# Patient Record
Sex: FEMALE | Race: WHITE | NOT HISPANIC OR LATINO | Employment: FULL TIME | URBAN - METROPOLITAN AREA
[De-identification: names, ages, dates, MRNs, and addresses within clinical notes are randomized per-mention and may not be internally consistent; named-entity substitution may affect disease eponyms.]

---

## 2024-08-25 ENCOUNTER — APPOINTMENT (OUTPATIENT)
Dept: PRIMARY CARE | Facility: CLINIC | Age: 49
End: 2024-08-25
Payer: COMMERCIAL

## 2024-08-26 ASSESSMENT — DERMATOLOGY QUALITY OF LIFE (QOL) ASSESSMENT
RATE HOW EMOTIONALLY BOTHERED YOU ARE BY YOUR SKIN PROBLEM (FOR EXAMPLE, WORRY, EMBARRASSMENT, FRUSTRATION): 6 - ALWAYS BOTHERED
WHAT SINGLE SKIN CONDITION LISTED BELOW IS THE PATIENT ANSWERING THE QUALITY-OF-LIFE ASSESSMENT QUESTIONS ABOUT: PSORIASIS
RATE HOW BOTHERED YOU ARE BY SYMPTOMS OF YOUR SKIN PROBLEM (EG, ITCHING, STINGING BURNING, HURTING OR SKIN IRRITATION): 6 - ALWAYS BOTHERED
RATE HOW BOTHERED YOU ARE BY EFFECTS OF YOUR SKIN PROBLEMS ON YOUR ACTIVITIES (EG, GOING OUT, ACCOMPLISHING WHAT YOU WANT, WORK ACTIVITIES OR YOUR RELATIONSHIPS WITH OTHERS): 6 - ALWAYS BOTHERED
WHAT SINGLE SKIN CONDITION LISTED BELOW IS THE PATIENT ANSWERING THE QUALITY-OF-LIFE ASSESSMENT QUESTIONS ABOUT: PSORIASIS
RATE HOW BOTHERED YOU ARE BY SYMPTOMS OF YOUR SKIN PROBLEM (EG, ITCHING, STINGING BURNING, HURTING OR SKIN IRRITATION): 6 - ALWAYS BOTHERED
RATE HOW BOTHERED YOU ARE BY EFFECTS OF YOUR SKIN PROBLEMS ON YOUR ACTIVITIES (EG, GOING OUT, ACCOMPLISHING WHAT YOU WANT, WORK ACTIVITIES OR YOUR RELATIONSHIPS WITH OTHERS): 6 - ALWAYS BOTHERED
DATE THE QUALITY-OF-LIFE ASSESSMENT WAS COMPLETED: 67078
RATE HOW EMOTIONALLY BOTHERED YOU ARE BY YOUR SKIN PROBLEM (FOR EXAMPLE, WORRY, EMBARRASSMENT, FRUSTRATION): 6 - ALWAYS BOTHERED

## 2024-08-26 ASSESSMENT — PATIENT GLOBAL ASSESSMENT (PGA): WHAT IS THE PGA: PATIENT GLOBAL ASSESSMENT:  4 - SEVERE

## 2024-08-27 ENCOUNTER — LAB (OUTPATIENT)
Dept: LAB | Facility: LAB | Age: 49
End: 2024-08-27
Payer: COMMERCIAL

## 2024-08-27 ENCOUNTER — OFFICE VISIT (OUTPATIENT)
Dept: DERMATOLOGY | Facility: CLINIC | Age: 49
End: 2024-08-27
Payer: COMMERCIAL

## 2024-08-27 DIAGNOSIS — L40.9 PSORIASIS: Primary | ICD-10-CM

## 2024-08-27 DIAGNOSIS — L40.9 PSORIASIS: ICD-10-CM

## 2024-08-27 PROCEDURE — 86481 TB AG RESPONSE T-CELL SUSP: CPT

## 2024-08-27 PROCEDURE — 86803 HEPATITIS C AB TEST: CPT

## 2024-08-27 PROCEDURE — 87389 HIV-1 AG W/HIV-1&-2 AB AG IA: CPT

## 2024-08-27 PROCEDURE — 80053 COMPREHEN METABOLIC PANEL: CPT

## 2024-08-27 PROCEDURE — 85025 COMPLETE CBC W/AUTO DIFF WBC: CPT

## 2024-08-27 PROCEDURE — 99204 OFFICE O/P NEW MOD 45 MIN: CPT | Performed by: STUDENT IN AN ORGANIZED HEALTH CARE EDUCATION/TRAINING PROGRAM

## 2024-08-27 PROCEDURE — 86704 HEP B CORE ANTIBODY TOTAL: CPT

## 2024-08-27 PROCEDURE — 87340 HEPATITIS B SURFACE AG IA: CPT

## 2024-08-27 PROCEDURE — 36415 COLL VENOUS BLD VENIPUNCTURE: CPT

## 2024-08-27 RX ORDER — CLOBETASOL PROPIONATE 0.5 MG/G
1 OINTMENT TOPICAL 2 TIMES DAILY
COMMUNITY
Start: 2022-08-08

## 2024-08-27 RX ORDER — ROSUVASTATIN CALCIUM 20 MG/1
1 TABLET, COATED ORAL NIGHTLY
COMMUNITY
Start: 2022-08-09 | End: 2024-08-29 | Stop reason: SDUPTHER

## 2024-08-27 RX ORDER — BETAMETHASONE DIPROPIONATE 0.5 MG/G
OINTMENT TOPICAL
Qty: 45 G | Refills: 3 | Status: SHIPPED | OUTPATIENT
Start: 2024-08-27

## 2024-08-27 RX ORDER — VARENICLINE TARTRATE 0.5 (11)-1
KIT ORAL
COMMUNITY
Start: 2022-08-08 | End: 2024-08-29 | Stop reason: SDUPTHER

## 2024-08-27 NOTE — PROGRESS NOTES
"Subjective   Ju Schwarz is a 49 y.o. female who presents for the following: Rash (Present on right foot for 2 years and is now on left foot and both hands. Notes joint pain throughout the day (has an appt in rheumatology Oct. 1). Currently using Clobetasol ointment (\"not frequently, feels this doesn't help), Gold Bond for psoriasis moisturizer, OTC hydrocortisone.)    Objective   Well appearing patient in no apparent distress; mood and affect are within normal limits.    A focused examination was performed including extremities, including the arms, hands, fingers, and fingernails and the legs, feet, toes, and toenails, head, including the scalp, face, neck, nose, ears, eyelids, and lips, and chest, axillae, abdomen, back, and buttocks. All findings within normal limits unless otherwise noted below.    Left Foot - Posterior, Left Hand - Anterior, Right Foot - Posterior, Right Hand - Anterior, Right Lower Leg - Anterior  Well-demarcated erythematous papules and plaques with overlying silvery scale. 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 Joint pain worsens after long periods of immobility - could be consistent.   -discussed nature of condition and recommendation for treatment with biologic medication. Failed topical therapy (clobetasol).   -Risks and benefits of Skyrizi include increased risk of infections, hypersensitivity reactions, injection site reactions, and need to avoid live vaccines. Patient verbalized understanding and desire to proceed with starting medication.   -In meantime, she can try switching topical to betamethasone diproprionate 0.05% oint BID to the affected areas under occlusion.  -baseline labs ordered  -no hx cancer, IBD, heart " failure. She reports questionable hx MS. Would avoid TNF inhibitors.   -rtc 2 months      Related Procedures  Follow Up In Dermatology - Established Patient  CBC and Auto Differential  Comprehensive metabolic panel  T-SPOT (Tb)  HIV 1/2 Antigen/Antibody Screen with Reflex to Confirmation  Hepatitis B Core Antibody, Total  Hepatitis B Surface Antigen  Hepatitis C Antibody    Related Medications  risankizumab-rzaa (Skyrizi) 150 mg/mL pen injector pen  Inject 1 mL (150 mg) under the skin 1 time for 1 dose. 4 weeks later, Inject 1 mL (150 mg) under the skin 1 time for 1 dose.    risankizumab-rzaa (Skyrizi) 150 mg/mL pen injector pen  Inject 1 mL (150 mg) under the skin every 12 weeks.    betamethasone dipropionate (Diprosone) 0.05 % ointment  Apply to the affected area twice daily.    RTC 3 months    Scribe Attestation  By signing my name below, ITracy LPN , Scribe   attest that this documentation has been prepared under the direction and in the presence of Dewayne Salgado MD.

## 2024-08-28 ENCOUNTER — SPECIALTY PHARMACY (OUTPATIENT)
Dept: PHARMACY | Facility: CLINIC | Age: 49
End: 2024-08-28

## 2024-08-28 LAB
ALBUMIN SERPL BCP-MCNC: 4.1 G/DL (ref 3.4–5)
ALP SERPL-CCNC: 108 U/L (ref 33–110)
ALT SERPL W P-5'-P-CCNC: 14 U/L (ref 7–45)
ANION GAP SERPL CALC-SCNC: 15 MMOL/L (ref 10–20)
AST SERPL W P-5'-P-CCNC: 12 U/L (ref 9–39)
BASOPHILS # BLD AUTO: 0.04 X10*3/UL (ref 0–0.1)
BASOPHILS NFR BLD AUTO: 0.6 %
BILIRUB SERPL-MCNC: 0.5 MG/DL (ref 0–1.2)
BUN SERPL-MCNC: 9 MG/DL (ref 6–23)
CALCIUM SERPL-MCNC: 9.6 MG/DL (ref 8.6–10.6)
CHLORIDE SERPL-SCNC: 104 MMOL/L (ref 98–107)
CO2 SERPL-SCNC: 26 MMOL/L (ref 21–32)
CREAT SERPL-MCNC: 0.81 MG/DL (ref 0.5–1.05)
EGFRCR SERPLBLD CKD-EPI 2021: 88 ML/MIN/1.73M*2
EOSINOPHIL # BLD AUTO: 0.08 X10*3/UL (ref 0–0.7)
EOSINOPHIL NFR BLD AUTO: 1.2 %
ERYTHROCYTE [DISTWIDTH] IN BLOOD BY AUTOMATED COUNT: 14.3 % (ref 11.5–14.5)
GLUCOSE SERPL-MCNC: 103 MG/DL (ref 74–99)
HBV CORE AB SER QL: NONREACTIVE
HBV SURFACE AG SERPL QL IA: NONREACTIVE
HCT VFR BLD AUTO: 43.4 % (ref 36–46)
HCV AB SER QL: NONREACTIVE
HGB BLD-MCNC: 13.4 G/DL (ref 12–16)
HIV 1+2 AB+HIV1 P24 AG SERPL QL IA: NONREACTIVE
IMM GRANULOCYTES # BLD AUTO: 0.01 X10*3/UL (ref 0–0.7)
IMM GRANULOCYTES NFR BLD AUTO: 0.2 % (ref 0–0.9)
LYMPHOCYTES # BLD AUTO: 2.24 X10*3/UL (ref 1.2–4.8)
LYMPHOCYTES NFR BLD AUTO: 33.7 %
MCH RBC QN AUTO: 28.8 PG (ref 26–34)
MCHC RBC AUTO-ENTMCNC: 30.9 G/DL (ref 32–36)
MCV RBC AUTO: 93 FL (ref 80–100)
MONOCYTES # BLD AUTO: 0.53 X10*3/UL (ref 0.1–1)
MONOCYTES NFR BLD AUTO: 8 %
NEUTROPHILS # BLD AUTO: 3.75 X10*3/UL (ref 1.2–7.7)
NEUTROPHILS NFR BLD AUTO: 56.3 %
NRBC BLD-RTO: 0 /100 WBCS (ref 0–0)
PLATELET # BLD AUTO: 251 X10*3/UL (ref 150–450)
POTASSIUM SERPL-SCNC: 4.6 MMOL/L (ref 3.5–5.3)
PROT SERPL-MCNC: 6.8 G/DL (ref 6.4–8.2)
RBC # BLD AUTO: 4.66 X10*6/UL (ref 4–5.2)
SODIUM SERPL-SCNC: 140 MMOL/L (ref 136–145)
WBC # BLD AUTO: 6.7 X10*3/UL (ref 4.4–11.3)

## 2024-08-29 ENCOUNTER — OFFICE VISIT (OUTPATIENT)
Dept: PRIMARY CARE | Facility: CLINIC | Age: 49
End: 2024-08-29
Payer: COMMERCIAL

## 2024-08-29 ENCOUNTER — TELEPHONE (OUTPATIENT)
Dept: PRIMARY CARE | Facility: CLINIC | Age: 49
End: 2024-08-29

## 2024-08-29 VITALS
HEART RATE: 65 BPM | OXYGEN SATURATION: 97 % | DIASTOLIC BLOOD PRESSURE: 78 MMHG | HEIGHT: 69 IN | WEIGHT: 271 LBS | BODY MASS INDEX: 40.14 KG/M2 | SYSTOLIC BLOOD PRESSURE: 122 MMHG

## 2024-08-29 DIAGNOSIS — R73.9 HYPERGLYCEMIA: ICD-10-CM

## 2024-08-29 DIAGNOSIS — F17.200 CURRENTLY SMOKES TOBACCO: Chronic | ICD-10-CM

## 2024-08-29 DIAGNOSIS — E66.01 OBESITY, MORBID, BMI 40.0-49.9 (MULTI): Chronic | ICD-10-CM

## 2024-08-29 DIAGNOSIS — Z90.710 S/P HYSTERECTOMY: ICD-10-CM

## 2024-08-29 DIAGNOSIS — Z13.29 THYROID DISORDER SCREENING: ICD-10-CM

## 2024-08-29 DIAGNOSIS — E78.2 MIXED HYPERLIPIDEMIA: Primary | ICD-10-CM

## 2024-08-29 DIAGNOSIS — F90.2 ADHD (ATTENTION DEFICIT HYPERACTIVITY DISORDER), COMBINED TYPE: Chronic | ICD-10-CM

## 2024-08-29 DIAGNOSIS — Z12.11 ENCOUNTER FOR SCREENING FOR MALIGNANT NEOPLASM OF COLON: ICD-10-CM

## 2024-08-29 LAB
ALBUMIN SERPL BCP-MCNC: 4 G/DL (ref 3.4–5)
ALP SERPL-CCNC: 123 U/L (ref 33–110)
ALT SERPL W P-5'-P-CCNC: 14 U/L (ref 7–45)
ANION GAP SERPL CALC-SCNC: 14 MMOL/L (ref 10–20)
AST SERPL W P-5'-P-CCNC: 11 U/L (ref 9–39)
BILIRUB SERPL-MCNC: 0.3 MG/DL (ref 0–1.2)
BUN SERPL-MCNC: 10 MG/DL (ref 6–23)
CALCIUM SERPL-MCNC: 9.5 MG/DL (ref 8.6–10.6)
CHLORIDE SERPL-SCNC: 103 MMOL/L (ref 98–107)
CO2 SERPL-SCNC: 25 MMOL/L (ref 21–32)
CREAT SERPL-MCNC: 0.73 MG/DL (ref 0.5–1.05)
EGFRCR SERPLBLD CKD-EPI 2021: >90 ML/MIN/1.73M*2
ERYTHROCYTE [DISTWIDTH] IN BLOOD BY AUTOMATED COUNT: 14 % (ref 11.5–14.5)
EST. AVERAGE GLUCOSE BLD GHB EST-MCNC: 126 MG/DL
GLUCOSE SERPL-MCNC: 89 MG/DL (ref 74–99)
HBA1C MFR BLD: 6 %
HCT VFR BLD AUTO: 43.4 % (ref 36–46)
HGB BLD-MCNC: 13.6 G/DL (ref 12–16)
MCH RBC QN AUTO: 28.9 PG (ref 26–34)
MCHC RBC AUTO-ENTMCNC: 31.3 G/DL (ref 32–36)
MCV RBC AUTO: 92 FL (ref 80–100)
NIL(NEG) CONTROL SPOT COUNT: NORMAL
NRBC BLD-RTO: 0 /100 WBCS (ref 0–0)
PANEL A SPOT COUNT: 0
PANEL B SPOT COUNT: 0
PLATELET # BLD AUTO: 247 X10*3/UL (ref 150–450)
POS CONTROL SPOT COUNT: NORMAL
POTASSIUM SERPL-SCNC: 4.7 MMOL/L (ref 3.5–5.3)
PROGEST SERPL-MCNC: 0.3 NG/ML
PROT SERPL-MCNC: 6.7 G/DL (ref 6.4–8.2)
RBC # BLD AUTO: 4.7 X10*6/UL (ref 4–5.2)
SODIUM SERPL-SCNC: 137 MMOL/L (ref 136–145)
T-SPOT. TB INTERPRETATION: NEGATIVE
TSH SERPL-ACNC: 0.67 MIU/L (ref 0.44–3.98)
WBC # BLD AUTO: 8.3 X10*3/UL (ref 4.4–11.3)

## 2024-08-29 PROCEDURE — 99214 OFFICE O/P EST MOD 30 MIN: CPT | Performed by: STUDENT IN AN ORGANIZED HEALTH CARE EDUCATION/TRAINING PROGRAM

## 2024-08-29 PROCEDURE — 84144 ASSAY OF PROGESTERONE: CPT

## 2024-08-29 PROCEDURE — 84443 ASSAY THYROID STIM HORMONE: CPT

## 2024-08-29 PROCEDURE — 85027 COMPLETE CBC AUTOMATED: CPT

## 2024-08-29 PROCEDURE — 83036 HEMOGLOBIN GLYCOSYLATED A1C: CPT

## 2024-08-29 PROCEDURE — 99406 BEHAV CHNG SMOKING 3-10 MIN: CPT | Performed by: STUDENT IN AN ORGANIZED HEALTH CARE EDUCATION/TRAINING PROGRAM

## 2024-08-29 PROCEDURE — 3008F BODY MASS INDEX DOCD: CPT | Performed by: STUDENT IN AN ORGANIZED HEALTH CARE EDUCATION/TRAINING PROGRAM

## 2024-08-29 PROCEDURE — 80053 COMPREHEN METABOLIC PANEL: CPT

## 2024-08-29 RX ORDER — LISDEXAMFETAMINE DIMESYLATE 40 MG/1
40 CAPSULE ORAL EVERY MORNING
Qty: 30 CAPSULE | Refills: 0 | Status: SHIPPED | OUTPATIENT
Start: 2024-09-28 | End: 2024-10-28

## 2024-08-29 RX ORDER — VARENICLINE TARTRATE 0.5 (11)-1
KIT ORAL
Qty: 53 EACH | Refills: 0 | Status: SHIPPED | OUTPATIENT
Start: 2024-08-29

## 2024-08-29 RX ORDER — ROSUVASTATIN CALCIUM 20 MG/1
20 TABLET, COATED ORAL NIGHTLY
Qty: 90 TABLET | Refills: 1 | Status: SHIPPED | OUTPATIENT
Start: 2024-08-29

## 2024-08-29 RX ORDER — LISDEXAMFETAMINE DIMESYLATE 40 MG/1
40 CAPSULE ORAL EVERY MORNING
Qty: 30 CAPSULE | Refills: 0 | Status: SHIPPED | OUTPATIENT
Start: 2024-10-28 | End: 2024-11-27

## 2024-08-29 RX ORDER — LISDEXAMFETAMINE DIMESYLATE 40 MG/1
40 CAPSULE ORAL EVERY MORNING
Qty: 30 CAPSULE | Refills: 0 | Status: SHIPPED | OUTPATIENT
Start: 2024-08-29 | End: 2024-09-28

## 2024-08-29 ASSESSMENT — ENCOUNTER SYMPTOMS
ARTHRALGIAS: 1
NEUROLOGICAL NEGATIVE: 1
UNEXPECTED WEIGHT CHANGE: 1
GASTROINTESTINAL NEGATIVE: 1
FATIGUE: 1
RESPIRATORY NEGATIVE: 1
SLEEP DISTURBANCE: 1
CARDIOVASCULAR NEGATIVE: 1

## 2024-08-29 ASSESSMENT — PATIENT HEALTH QUESTIONNAIRE - PHQ9
SUM OF ALL RESPONSES TO PHQ9 QUESTIONS 1 AND 2: 0
1. LITTLE INTEREST OR PLEASURE IN DOING THINGS: NOT AT ALL
2. FEELING DOWN, DEPRESSED OR HOPELESS: NOT AT ALL

## 2024-08-29 NOTE — PROGRESS NOTES
Subjective   Patient ID: Ju Schwarz is a 49 y.o. female who presents for Obesity (Would like to talk about weight loss options /Refill for chantix /).  Last seen 2 years ago.     Still has foot rash. Saw dermatology for psoriasis. Also having joint pains. Seeing rheumatologist in October.     Wants to lose weight.    Has coffee in the morning. Skips breakfast and lunch. Works nights. Fast food for dinner sometimes. Snacks on popsicles and ice cream.     Hard time sleeping during the day. Gets approx 4 hours sleep. Drinks a lot of coffee.     Hx of ADHD. Used to be on vyvanse. Did not have any side effects on this. Tolerated well and controlled her symptoms well.     Prior hysterectomy. Starting to have hot flashes.     Smoking 1 ppd. Wants refill of chantix to help her quit.     Does not think she has ever had a mammogram. Did not end up doing cologuard previously.     No other concerns today.         Review of Systems   Constitutional:  Positive for fatigue and unexpected weight change.   HENT: Negative.     Respiratory: Negative.     Cardiovascular: Negative.    Gastrointestinal: Negative.    Endocrine: Positive for heat intolerance.   Musculoskeletal:  Positive for arthralgias.   Skin:  Positive for rash.   Neurological: Negative.    Psychiatric/Behavioral:  Positive for sleep disturbance.    All other systems reviewed and are negative.      Objective   Physical Exam  Constitutional:       General: She is not in acute distress.  Pulmonary:      Effort: Pulmonary effort is normal. No respiratory distress.   Skin:     General: Skin is warm and dry.   Neurological:      Mental Status: She is alert. Mental status is at baseline.   Psychiatric:         Mood and Affect: Mood normal.         Behavior: Behavior normal.       Body mass index is 40.02 kg/m².      Current Outpatient Medications:     betamethasone dipropionate (Diprosone) 0.05 % ointment, Apply to the affected area twice daily., Disp: 45 g, Rfl: 3     clobetasol (Temovate) 0.05 % ointment, Apply 1 Application topically twice a day., Disp: , Rfl:     lisdexamfetamine (Vyvanse) 40 mg capsule, Take 1 capsule (40 mg) by mouth once daily in the morning., Disp: 30 capsule, Rfl: 0    [START ON 9/28/2024] lisdexamfetamine (Vyvanse) 40 mg capsule, Take 1 capsule (40 mg) by mouth once daily in the morning. Do not fill before September 28, 2024., Disp: 30 capsule, Rfl: 0    [START ON 10/28/2024] lisdexamfetamine (Vyvanse) 40 mg capsule, Take 1 capsule (40 mg) by mouth once daily in the morning. Do not fill before October 28, 2024., Disp: 30 capsule, Rfl: 0    risankizumab-rzaa (Skyrizi) 150 mg/mL pen injector pen, Inject 1 mL (150 mg) under the skin every 12 weeks., Disp: 1 mL, Rfl: 3    risankizumab-rzaa (Skyrizi) 150 mg/mL pen injector pen, Inject 1 mL (150 mg) under the skin 1 time for 1 dose. 4 weeks later, Inject 1 mL (150 mg) under the skin 1 time for 1 dose., Disp: 2 mL, Rfl: 0    rosuvastatin (Crestor) 20 mg tablet, Take 1 tablet (20 mg) by mouth once daily at bedtime., Disp: 90 tablet, Rfl: 1    varenicline (Chantix JACKIE) 0.5 mg (11)- 1 mg (42) tablet, Take as directed on package, Disp: 53 each, Rfl: 0        Assessment/Plan   Diagnoses and all orders for this visit:  Mixed hyperlipidemia  Comments:  resume statin   check lipid panel next visit  Orders:  -     rosuvastatin (Crestor) 20 mg tablet; Take 1 tablet (20 mg) by mouth once daily at bedtime.  -     CBC  Currently smokes tobacco  Comments:  chantix refilled  Orders:  -     varenicline (Chantix JACKIE) 0.5 mg (11)- 1 mg (42) tablet; Take as directed on package  Encounter for screening for malignant neoplasm of colon  Comments:  no prior colon cancer screening  Orders:  -     Cologuard® colon cancer screening; Future  Hyperglycemia  -     Comprehensive Metabolic Panel  -     Hemoglobin A1c  Thyroid disorder screening  -     TSH with reflex to Free T4 if abnormal  S/P hysterectomy  Comments:  check progesterone  level  Orders:  -     Progesterone  ADHD (attention deficit hyperactivity disorder), combined type  Comments:  CSA completed  start with 40mg vyvanse and reassess  Orders:  -     lisdexamfetamine (Vyvanse) 40 mg capsule; Take 1 capsule (40 mg) by mouth once daily in the morning.  -     lisdexamfetamine (Vyvanse) 40 mg capsule; Take 1 capsule (40 mg) by mouth once daily in the morning. Do not fill before September 28, 2024.  -     lisdexamfetamine (Vyvanse) 40 mg capsule; Take 1 capsule (40 mg) by mouth once daily in the morning. Do not fill before October 28, 2024.  Obesity, morbid, BMI 40.0-49.9 (Multi)  Comments:  encouraged adding in more protein and limiting processed foods    Greater than 3 minutes spent in tobacco cessation counseling including behavioral and pharmaceutical options       Follow up in 3 months       Isatu Fountain DO 08/29/24 12:47 PM

## 2024-08-29 NOTE — TELEPHONE ENCOUNTER
"Pt called stating her insurance wasn't covering vyvanse stating she was too \"old\"  for it. I put in a PA for it first to see if that helps. But if that doesn't work, is there an alternative you can offer?  "

## 2024-08-30 ENCOUNTER — PATIENT MESSAGE (OUTPATIENT)
Dept: PRIMARY CARE | Facility: CLINIC | Age: 49
End: 2024-08-30
Payer: COMMERCIAL

## 2024-08-30 DIAGNOSIS — L40.9 PSORIASIS: ICD-10-CM

## 2024-08-30 DIAGNOSIS — R73.9 HYPERGLYCEMIA: Primary | ICD-10-CM

## 2024-08-30 DIAGNOSIS — M19.90 ARTHRITIS: ICD-10-CM

## 2024-08-30 DIAGNOSIS — F90.2 ADHD (ATTENTION DEFICIT HYPERACTIVITY DISORDER), COMBINED TYPE: ICD-10-CM

## 2024-09-03 DIAGNOSIS — F90.2 ADHD (ATTENTION DEFICIT HYPERACTIVITY DISORDER), COMBINED TYPE: Chronic | ICD-10-CM

## 2024-09-04 ENCOUNTER — PATIENT MESSAGE (OUTPATIENT)
Dept: DERMATOLOGY | Facility: CLINIC | Age: 49
End: 2024-09-04
Payer: COMMERCIAL

## 2024-09-09 RX ORDER — LISDEXAMFETAMINE DIMESYLATE 40 MG/1
40 CAPSULE ORAL EVERY MORNING
Qty: 10 CAPSULE | Refills: 0 | Status: SHIPPED | OUTPATIENT
Start: 2024-09-09 | End: 2024-09-19

## 2024-09-10 RX ORDER — METFORMIN HYDROCHLORIDE 500 MG/1
500 TABLET, EXTENDED RELEASE ORAL
Qty: 90 TABLET | Refills: 1 | Status: SHIPPED | OUTPATIENT
Start: 2024-09-10

## 2024-09-11 RX ORDER — DEXTROAMPHETAMINE SACCHARATE, AMPHETAMINE ASPARTATE MONOHYDRATE, DEXTROAMPHETAMINE SULFATE AND AMPHETAMINE SULFATE 7.5; 7.5; 7.5; 7.5 MG/1; MG/1; MG/1; MG/1
30 CAPSULE, EXTENDED RELEASE ORAL EVERY MORNING
Qty: 30 CAPSULE | Refills: 0 | Status: SHIPPED | OUTPATIENT
Start: 2024-09-11 | End: 2024-10-11

## 2024-09-27 ENCOUNTER — PATIENT MESSAGE (OUTPATIENT)
Dept: PRIMARY CARE | Facility: CLINIC | Age: 49
End: 2024-09-27
Payer: COMMERCIAL

## 2024-09-27 DIAGNOSIS — F17.200 CURRENTLY SMOKES TOBACCO: Primary | ICD-10-CM

## 2024-09-27 RX ORDER — VARENICLINE TARTRATE 1 MG/1
1 TABLET, FILM COATED ORAL 2 TIMES DAILY
Qty: 60 TABLET | Refills: 2 | Status: SHIPPED | OUTPATIENT
Start: 2024-09-27 | End: 2024-12-26

## 2024-10-01 ENCOUNTER — APPOINTMENT (OUTPATIENT)
Dept: RHEUMATOLOGY | Facility: CLINIC | Age: 49
End: 2024-10-01
Payer: COMMERCIAL

## 2024-10-15 ENCOUNTER — PATIENT MESSAGE (OUTPATIENT)
Dept: DERMATOLOGY | Facility: CLINIC | Age: 49
End: 2024-10-15
Payer: COMMERCIAL

## 2024-10-17 DIAGNOSIS — L40.9 PSORIASIS: ICD-10-CM

## 2024-10-17 RX ORDER — RISANKIZUMAB-RZAA 150 MG/ML
150 INJECTION SUBCUTANEOUS ONCE
Qty: 2 ML | Refills: 0 | Status: CANCELLED | OUTPATIENT
Start: 2024-10-17 | End: 2024-10-17

## 2024-10-18 ENCOUNTER — PATIENT MESSAGE (OUTPATIENT)
Dept: PRIMARY CARE | Facility: CLINIC | Age: 49
End: 2024-10-18
Payer: COMMERCIAL

## 2024-10-18 DIAGNOSIS — F90.2 ADHD (ATTENTION DEFICIT HYPERACTIVITY DISORDER), COMBINED TYPE: ICD-10-CM

## 2024-10-18 DIAGNOSIS — F17.200 CURRENTLY SMOKES TOBACCO: ICD-10-CM

## 2024-10-18 RX ORDER — DEXTROAMPHETAMINE SACCHARATE, AMPHETAMINE ASPARTATE MONOHYDRATE, DEXTROAMPHETAMINE SULFATE AND AMPHETAMINE SULFATE 7.5; 7.5; 7.5; 7.5 MG/1; MG/1; MG/1; MG/1
30 CAPSULE, EXTENDED RELEASE ORAL EVERY MORNING
Qty: 30 CAPSULE | Refills: 0 | Status: SHIPPED | OUTPATIENT
Start: 2024-10-18 | End: 2024-11-17

## 2024-10-18 RX ORDER — VARENICLINE TARTRATE 1 MG/1
1 TABLET, FILM COATED ORAL 2 TIMES DAILY
Qty: 60 TABLET | Refills: 2 | Status: SHIPPED | OUTPATIENT
Start: 2024-10-18 | End: 2025-01-16

## 2024-10-23 ENCOUNTER — SPECIALTY PHARMACY (OUTPATIENT)
Dept: PHARMACY | Facility: CLINIC | Age: 49
End: 2024-10-23

## 2024-10-29 ENCOUNTER — SPECIALTY PHARMACY (OUTPATIENT)
Dept: PHARMACY | Facility: CLINIC | Age: 49
End: 2024-10-29

## 2024-10-29 PROCEDURE — RXMED WILLOW AMBULATORY MEDICATION CHARGE

## 2024-10-30 ENCOUNTER — PHARMACY VISIT (OUTPATIENT)
Dept: PHARMACY | Facility: CLINIC | Age: 49
End: 2024-10-30
Payer: COMMERCIAL

## 2024-10-31 ENCOUNTER — TELEMEDICINE CLINICAL SUPPORT (OUTPATIENT)
Dept: PHARMACY | Facility: HOSPITAL | Age: 49
End: 2024-10-31
Payer: COMMERCIAL

## 2024-10-31 DIAGNOSIS — L40.9 PSORIASIS: ICD-10-CM

## 2024-11-06 ENCOUNTER — LAB (OUTPATIENT)
Dept: LAB | Facility: LAB | Age: 49
End: 2024-11-06
Payer: COMMERCIAL

## 2024-11-06 ENCOUNTER — HOSPITAL ENCOUNTER (OUTPATIENT)
Dept: RADIOLOGY | Facility: CLINIC | Age: 49
Discharge: HOME | End: 2024-11-06
Payer: COMMERCIAL

## 2024-11-06 ENCOUNTER — APPOINTMENT (OUTPATIENT)
Dept: RHEUMATOLOGY | Facility: CLINIC | Age: 49
End: 2024-11-06
Payer: COMMERCIAL

## 2024-11-06 VITALS
DIASTOLIC BLOOD PRESSURE: 80 MMHG | BODY MASS INDEX: 38.25 KG/M2 | SYSTOLIC BLOOD PRESSURE: 119 MMHG | WEIGHT: 259 LBS | HEART RATE: 72 BPM

## 2024-11-06 DIAGNOSIS — M12.9 ARTHROPATHY: ICD-10-CM

## 2024-11-06 DIAGNOSIS — M12.9 ARTHROPATHY: Primary | ICD-10-CM

## 2024-11-06 LAB
25(OH)D3 SERPL-MCNC: 28 NG/ML (ref 30–100)
CCP IGG SERPL-ACNC: <1 U/ML
CRP SERPL-MCNC: 0.39 MG/DL
ERYTHROCYTE [SEDIMENTATION RATE] IN BLOOD BY WESTERGREN METHOD: 20 MM/H (ref 0–30)
RHEUMATOID FACT SER NEPH-ACNC: 11 IU/ML (ref 0–15)
URATE SERPL-MCNC: 5.5 MG/DL (ref 2.3–6.7)

## 2024-11-06 PROCEDURE — 86431 RHEUMATOID FACTOR QUANT: CPT

## 2024-11-06 PROCEDURE — 86140 C-REACTIVE PROTEIN: CPT

## 2024-11-06 PROCEDURE — 82306 VITAMIN D 25 HYDROXY: CPT

## 2024-11-06 PROCEDURE — 86200 CCP ANTIBODY: CPT

## 2024-11-06 PROCEDURE — 72202 X-RAY EXAM SI JOINTS 3/> VWS: CPT

## 2024-11-06 PROCEDURE — 73130 X-RAY EXAM OF HAND: CPT | Mod: BILATERAL PROCEDURE | Performed by: RADIOLOGY

## 2024-11-06 PROCEDURE — 36415 COLL VENOUS BLD VENIPUNCTURE: CPT

## 2024-11-06 PROCEDURE — 85652 RBC SED RATE AUTOMATED: CPT

## 2024-11-06 PROCEDURE — 72202 X-RAY EXAM SI JOINTS 3/> VWS: CPT | Performed by: RADIOLOGY

## 2024-11-06 PROCEDURE — 73600 X-RAY EXAM OF ANKLE: CPT | Mod: BILATERAL PROCEDURE | Performed by: RADIOLOGY

## 2024-11-06 PROCEDURE — 73130 X-RAY EXAM OF HAND: CPT | Mod: 50

## 2024-11-06 PROCEDURE — 73600 X-RAY EXAM OF ANKLE: CPT | Mod: 50

## 2024-11-06 PROCEDURE — 99204 OFFICE O/P NEW MOD 45 MIN: CPT | Performed by: INTERNAL MEDICINE

## 2024-11-06 PROCEDURE — 84550 ASSAY OF BLOOD/URIC ACID: CPT

## 2024-11-06 NOTE — PROGRESS NOTES
Initial Rheumatology Patient Visit    Chief Complaint:  Ju Schwarz is a 49 y.o. female presenting today for New Patient Visit.    History of Presenting Problem:   49 y.o.  y.o. 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  Presents for evaluation of various joint complaints.  She report pain started in 2020, after her issues with chronic knee pain since 2017 s/p replacement. She report pain has become more generalized, she report her worse joint is in her bilateral ankle and left shoulder but pain is diffuse. She report an episode of pain in her achilles that took her to the ER.   Patient generally does not like to take pills but she has tried naproxen in the past and it helped about 70%.  She was started on Skyrizi by dermatology about 2 weeks ago, so far she reports some GI side effects with medication    Problem List: There is no problem list on file for this patient.      Past Medical History:   Past Medical History:   Diagnosis Date    Arthritis     Eczema        Surgical History: No past surgical history on file.     Allergies:   Allergies   Allergen Reactions    Oxycodone-Acetaminophen Hives and Other     Constipation    Penicillins Fever, Hives, Itching, Rash and Swelling       Medications:   Current Outpatient Medications:     amphetamine-dextroamphetamine XR (Adderall XR) 30 mg 24 hr capsule, Take 1 capsule (30 mg) by mouth once daily in the morning. Do not crush or chew., Disp: 30 capsule, Rfl: 0    betamethasone dipropionate (Diprosone) 0.05 % ointment, Apply to the affected area twice daily., Disp: 45 g, Rfl: 3    clobetasol (Temovate) 0.05 % ointment, Apply 1 Application topically twice a day., Disp: , Rfl:     metFORMIN  mg 24 hr tablet, Take 1 tablet (500 mg) by mouth once daily in the evening. Take with meals. Do not crush, chew, or split., Disp: 90 tablet, Rfl: 1    risankizumab-rzaa (Skyrizi) 150 mg/mL pen injector pen, Inject  1 mL (150 mg) under the skin see administration instructions. Inject 150mg (1 pen) on week 4, then every 12 weeks thereafter, Disp: 1 mL, Rfl: 4    rosuvastatin (Crestor) 20 mg tablet, Take 1 tablet (20 mg) by mouth once daily at bedtime., Disp: 90 tablet, Rfl: 1    varenicline (Chantix JACKIE) 0.5 mg (11)- 1 mg (42) tablet, Take as directed on package, Disp: 53 each, Rfl: 0    varenicline (Chantix) 1 mg tablet, Take 1 tablet (1 mg) by mouth 2 times a day. Take with full glass of water., Disp: 60 tablet, Rfl: 2    lisdexamfetamine (Vyvanse) 40 mg capsule, Take 1 capsule (40 mg) by mouth once daily in the morning for 10 doses., Disp: 10 capsule, Rfl: 0    Review of Systems:   ROS: She report Morning stiffness, She report ankle joint pain and swelling, dry eyes and mouth, Hx of chronic sinusitis, Hx of ear inflammation, oral, nasal or genital ulcers, Denies sun sensitivity, Raynaud's phenomenon. Denies Uveitis or recent vision changes. Denies new rashes or Hx of Psoriasis, Denies alopecia,   Denies Chest pain/SOB, cough, Hx of asthma, Denies Fever/chills/sweats, Denies Fatigue, weight loss  Denies abdominal pain, New onset Dyspepsia, dysphasia, nausea/vomiting/diarrhea, dark/tarry stools, blood in stools or urine. She report Chronic back pain for  5yrs .   Denies Hx of blood clot. She report  Hx of easy bruising with minor trauma or bleeding. Denies Headaches, She report numbness and tingling and feet based on position , weakness.  All other systems have been reviewed and are negative for complaint.   Mother with RA    Objective   Physical Examination:    Visit Vitals  /80   Pulse 72   Wt 117 kg (259 lb)   BMI 38.25 kg/m²   Smoking Status Every Day   BSA 2.39 m²        Gen: NAD, A&O x 3  HEENT: clear sclera and conjunctiva,     Musculoskeletal:   Neck; WNL, full ROM  Shoulder: WNL, full ROM  Elbow:WNL, full ROM, no effusion noted  Wrist and fingers;no active synovitis noted, Full ROM in the Wrist , Good fist and    Knees:  No effusions or crepitation, full ROM.  Hips; WNL, full ROM, Negative Everette test  Ankle, Feet; WNL, full ROM    Skin: No rashes or lesions seen, no nail changes  Neuro: A&O x3, Normal Gait    Procedures :None    Orders:  Orders Placed This Encounter   Procedures    XR hand 3+ views bilateral    XR sacroiliac joints 3+ views    XR ankle bilateral 2 views    Rheumatoid Factor    Citrulline Antibody, IgG    Uric Acid    C-Reactive Protein    Sedimentation Rate    Vitamin D 25-Hydroxy,Total (for eval of Vitamin D levels)        Provider Impression:   Assessment/Plan   Encounter Diagnosis   Name Primary?    Arthropathy Yes        49 y.o.  y.o. 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  Presents for evaluation of various joint complaints.  Rule out underlying psoriatic arthritis versus other critical condition on exam she did not have any signs of dactylitis or enthesitis. Discussed with patient if patient has underlying psoriasis Skyrizi will eventually help her joint pain however will rule out other inflammatory conditions  -Recommend patient try naproxen 1 to 2 tablets twice a day over-the-counter as needed  -Obtain screening x-rays  -Obtain serology  -Follow up based on results

## 2024-11-07 ENCOUNTER — APPOINTMENT (OUTPATIENT)
Dept: DERMATOLOGY | Facility: CLINIC | Age: 49
End: 2024-11-07
Payer: COMMERCIAL

## 2024-11-07 ENCOUNTER — TELEPHONE (OUTPATIENT)
Dept: RHEUMATOLOGY | Facility: CLINIC | Age: 49
End: 2024-11-07

## 2024-11-20 PROCEDURE — RXMED WILLOW AMBULATORY MEDICATION CHARGE

## 2024-11-21 ENCOUNTER — SPECIALTY PHARMACY (OUTPATIENT)
Dept: PHARMACY | Facility: CLINIC | Age: 49
End: 2024-11-21

## 2024-11-22 ENCOUNTER — PHARMACY VISIT (OUTPATIENT)
Dept: PHARMACY | Facility: CLINIC | Age: 49
End: 2024-11-22
Payer: COMMERCIAL

## 2025-02-07 ENCOUNTER — SPECIALTY PHARMACY (OUTPATIENT)
Dept: PHARMACY | Facility: CLINIC | Age: 50
End: 2025-02-07

## 2025-02-16 ENCOUNTER — SPECIALTY PHARMACY (OUTPATIENT)
Dept: PHARMACY | Facility: CLINIC | Age: 50
End: 2025-02-16

## 2025-02-25 ENCOUNTER — SPECIALTY PHARMACY (OUTPATIENT)
Dept: PHARMACY | Facility: CLINIC | Age: 50
End: 2025-02-25

## 2025-03-07 ENCOUNTER — SPECIALTY PHARMACY (OUTPATIENT)
Dept: PHARMACY | Facility: CLINIC | Age: 50
End: 2025-03-07

## 2025-03-11 DIAGNOSIS — R73.9 HYPERGLYCEMIA: ICD-10-CM

## 2025-03-11 RX ORDER — METFORMIN HYDROCHLORIDE 500 MG/1
500 TABLET, EXTENDED RELEASE ORAL
Qty: 90 TABLET | Refills: 1 | Status: SHIPPED | OUTPATIENT
Start: 2025-03-11

## 2025-03-12 ENCOUNTER — SPECIALTY PHARMACY (OUTPATIENT)
Dept: PHARMACY | Facility: CLINIC | Age: 50
End: 2025-03-12

## 2025-03-13 ENCOUNTER — SPECIALTY PHARMACY (OUTPATIENT)
Dept: PHARMACY | Facility: CLINIC | Age: 50
End: 2025-03-13

## 2025-09-05 ENCOUNTER — TELEPHONE (OUTPATIENT)
Dept: DERMATOLOGY | Facility: CLINIC | Age: 50
End: 2025-09-05
Payer: COMMERCIAL